# Patient Record
(demographics unavailable — no encounter records)

---

## 2025-01-23 NOTE — ASSESSMENT
[FreeTextEntry1] : I commended the patient on his diligent right wrist immobilization and activity modification which was recommended for his stable right TFCC sprain at the last visit.  I explained that the patient can begin to wean off of splint wear at this time. I did recommend that the patient continue to modify his activities and gradually progress with light activities as he feels comfortable. I also recommended a gradual progression of weight bearing and lifting activities over the course of the next 4-6 weeks. I did advise the patient to follow up with me if he notices persistent or recurrent symptoms. All questions were answered and he was in accordance with the plan.

## 2025-01-23 NOTE — PHYSICAL EXAM
[de-identified] : Physical exam demonstrates the patient to be alert and oriented x 3 and capable of ambulation. The patient is well-developed and well-nourished in no apparent respiratory distress. The majority of the skin is intact bilaterally in the upper extremities without any bilateral elbow lymphadenopathy.  Evaluation of both elbows reveals full symmetric range of motion from full extension to 140 of flexion with full pronation and full supination.   The wrists have symmetric range of motion bilaterally. There is mild but improved tenderness over the right TFCC, no instability of the DRUJ.  No ECU subluxation with ice-cream scoop maneuver. Negative Synergy test. There is no tenderness over the scaphoid, scapholunate, or lunotriquetral ligaments bilaterally. There is a negative Gold's test bilaterally. There is no tenderness over the distal radial ulnar joint or TFCC and no evidence of instability bilaterally. There is no tenderness over the pisotriquetral joint, hamate hook, or CMC joints bilaterally. The patient is nontender over both scaphoids and anatomic snuffbox is bilaterally. There is no clubbing cyanosis or edema.  Full, symmetric digital range of motion.  There is good capillary refill of the digits bilaterally. Sensation is intact to light touch bilaterally.

## 2025-01-23 NOTE — HISTORY OF PRESENT ILLNESS
[FreeTextEntry1] : The patient is presenting for a follow up visit in regard to his mildly improved right ulnar sided wrist pain which was treated for his stable right TFCC sprain vs partial tear. The patient has been wearing his custom splint as directed.